# Patient Record
Sex: FEMALE | Race: WHITE | NOT HISPANIC OR LATINO | URBAN - METROPOLITAN AREA
[De-identification: names, ages, dates, MRNs, and addresses within clinical notes are randomized per-mention and may not be internally consistent; named-entity substitution may affect disease eponyms.]

---

## 2020-07-16 ENCOUNTER — APPOINTMENT (RX ONLY)
Dept: URBAN - METROPOLITAN AREA CLINIC 157 | Facility: CLINIC | Age: 65
Setting detail: DERMATOLOGY
End: 2020-07-16

## 2020-07-16 VITALS — DIASTOLIC BLOOD PRESSURE: 79 MMHG | WEIGHT: 240 LBS | HEART RATE: 56 BPM | SYSTOLIC BLOOD PRESSURE: 138 MMHG

## 2020-07-16 DIAGNOSIS — L72.0 EPIDERMAL CYST: ICD-10-CM

## 2020-07-16 DIAGNOSIS — D69.2 OTHER NONTHROMBOCYTOPENIC PURPURA: ICD-10-CM

## 2020-07-16 PROCEDURE — 99202 OFFICE O/P NEW SF 15 MIN: CPT

## 2020-07-16 PROCEDURE — ? COUNSELING

## 2020-07-16 ASSESSMENT — LOCATION DETAILED DESCRIPTION DERM
LOCATION DETAILED: LEFT PROXIMAL DORSAL FOREARM
LOCATION DETAILED: RIGHT DISTAL DORSAL FOREARM
LOCATION DETAILED: LEFT DISTAL DORSAL FOREARM

## 2020-07-16 ASSESSMENT — LOCATION SIMPLE DESCRIPTION DERM
LOCATION SIMPLE: RIGHT FOREARM
LOCATION SIMPLE: LEFT FOREARM

## 2020-07-16 ASSESSMENT — SEVERITY ASSESSMENT: SEVERITY: MODERATE

## 2020-07-16 ASSESSMENT — BSA RASH: BSA RASH: 2

## 2020-07-16 ASSESSMENT — LOCATION ZONE DERM: LOCATION ZONE: ARM
